# Patient Record
Sex: FEMALE | Race: WHITE | NOT HISPANIC OR LATINO | Employment: FULL TIME | URBAN - NONMETROPOLITAN AREA
[De-identification: names, ages, dates, MRNs, and addresses within clinical notes are randomized per-mention and may not be internally consistent; named-entity substitution may affect disease eponyms.]

---

## 2023-11-22 ENCOUNTER — OFFICE VISIT (OUTPATIENT)
Dept: URGENT CARE | Facility: CLINIC | Age: 28
End: 2023-11-22
Payer: COMMERCIAL

## 2023-11-22 VITALS
HEART RATE: 101 BPM | OXYGEN SATURATION: 98 % | RESPIRATION RATE: 17 BRPM | TEMPERATURE: 98.3 F | DIASTOLIC BLOOD PRESSURE: 87 MMHG | SYSTOLIC BLOOD PRESSURE: 109 MMHG
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
## 2023-11-22 PROCEDURE — 99203 OFFICE O/P NEW LOW 30 MIN: CPT | Performed by: STUDENT IN AN ORGANIZED HEALTH CARE EDUCATION/TRAINING PROGRAM

## 2023-11-22 PROCEDURE — S9088 SERVICES PROVIDED IN URGENT: HCPCS | Performed by: STUDENT IN AN ORGANIZED HEALTH CARE EDUCATION/TRAINING PROGRAM

## 2023-11-22 RX ORDER — AMOXICILLIN AND CLAVULANATE POTASSIUM 875; 125 MG/1; MG/1
1 TABLET, FILM COATED ORAL EVERY 12 HOURS SCHEDULED
Qty: 14 TABLET | Refills: 0 | Status: SHIPPED | OUTPATIENT
Start: 2023-11-22 | End: 2023-11-29

## 2023-11-22 NOTE — PATIENT INSTRUCTIONS
Animal Bite   WHAT YOU NEED TO KNOW:   Animal bite injuries range from shallow cuts to deep, life-threatening wounds. An animal can cut or puncture the skin when it bites. Your skin may be torn from your body. Your skin may swell or bruise even if the bite does not break the skin. Animal bites occur more often on the hands, arms, legs, and face. Bites from dogs and cats are the most common injuries. DISCHARGE INSTRUCTIONS:   Return to the emergency department if:   You have a fever. Your wound is red, swollen, and draining pus. You see red streaks on the skin around the wound. You can no longer move the bitten area. Your heartbeat and breathing are much faster than usual.    You feel dizzy and confused. Contact your healthcare provider if:   Your pain does not get better, even after you take pain medicine. You have nightmares or flashbacks about the animal bite. You have questions or concerns about your condition or care. Medicines: You may need any of the following:  Antibiotics  prevent or treat a bacterial infection. Prescription pain medicine  may be given. Ask how to take this medicine safely. A tetanus vaccine  may be needed to prevent tetanus. Tetanus is a life-threatening bacterial infection that affects the nerves and muscles. The bacteria can be spread through animal bites. A rabies vaccine  may be needed to prevent rabies. Rabies is a life-threatening viral infection. The virus can be spread through animal bites. Take your medicine as directed. Contact your healthcare provider if you think your medicine is not helping or if you have side effects. Tell your provider if you are allergic to any medicine. Keep a list of the medicines, vitamins, and herbs you take. Include the amounts, and when and why you take them. Bring the list or the pill bottles to follow-up visits. Carry your medicine list with you in case of an emergency.     Follow up with your healthcare provider in 1 to 2 days: You may need to return to have your stitches removed. Write down your questions so you remember to ask them during your visits. Self-care:   Apply antibiotic ointment as directed. This helps prevent infection in minor skin wounds. It is available without a doctor's order. Keep the wound clean and covered. Wash the wound every day with soap and water or germ-killing cleanser. Ask your healthcare provider about the kinds of bandages to use. Apply ice on your wound. Ice helps decrease swelling and pain. Ice may also help prevent tissue damage. Use an ice pack, or put crushed ice in a plastic bag. Cover it with a towel and place it on your wound for 15 to 20 minutes every hour or as directed. Elevate the wound area. Raise your wound above the level of your heart as often as you can. This will help decrease swelling and pain. Prop your wound on pillows or blankets to keep it elevated comfortably. Prevent another animal bite:   Learn to recognize the signs of a scared or angry pet. Avoid quick, sudden movements. Do not step between animals that are fighting. Do not leave a pet alone with a young child. Do not disturb an animal while it eats, sleeps, or cares for its young. Do not approach an animal you do not know, especially one that is tied up or caged. Stay away from animals that seem sick or act strangely. Do not feed or capture wild animals. © Copyright [facility] 2023 Information is for End User's use only and may not be sold, redistributed or otherwise used for commercial purposes. The above information is an  only. It is not intended as medical advice for individual conditions or treatments. Talk to your doctor, nurse or pharmacist before following any medical regimen to see if it is safe and effective for you.

## 2023-11-22 NOTE — LETTER
November 22, 2023     Patient: Mahnaz Hollis   YOB: 1995   Date of Visit: 11/22/2023       To Whom it May Concern:    Mahnaz Hollis was seen in my clinic on 11/22/2023. If you have any questions or concerns, please don't hesitate to call.          Sincerely,          Rossana Huff, DO        CC: No Recipients

## 2023-11-22 NOTE — PROGRESS NOTES
[facility]        NAME: Wendie Dick is a 29 y.o. female  : 1995    MRN: [id number]    Assessment and Plan   Cat bite, initial encounter [W55.01XA]  1. Cat bite, initial encounter  amoxicillin-clavulanate (AUGMENTIN) 875-125 mg per tablet        Superficial lacerations <2cm and puncture wounds noted- no repair needed. Will start on augmentin course. Discussed importance of tdap and that I recommend it strongly but pt refused despite benefits discussed. Patient Instructions       Follow up with PCP in 3-5 days. Proceed to  ER if symptoms worsen. Chief Complaint     Chief Complaint   Patient presents with    Cat Bite     Bit by her own cat this am in face, and under left upper arm   Request note for work         History of Present Illness       HPI    p/w cat bite to her chin and left upper arm. Domestic cat, up to date with vaccines, no signs of rabies infection. Reports pt and cat were sleeping and on awakening her cat got scared and ended up biting her. Reports some bleeding on chin which has stopped. She washed all the areas with antibacterial green soap and has applied mupirocin. States never getting tdap and does not want one today. Review of Systems   Review of Systems   Constitutional:  Negative for chills and fever. HENT:  Negative for ear pain and sore throat. Eyes:  Negative for pain and visual disturbance. Respiratory:  Negative for cough, chest tightness and shortness of breath. Cardiovascular:  Negative for chest pain and palpitations. Gastrointestinal:  Negative for abdominal pain, constipation, diarrhea, nausea and vomiting. Genitourinary:  Negative for dysuria, hematuria and menstrual problem. Musculoskeletal:  Negative for arthralgias and back pain. Skin:  Positive for wound. Negative for color change and rash. Neurological:  Negative for seizures and syncope. Psychiatric/Behavioral:  Negative for dysphoric mood and suicidal ideas.     All other systems reviewed and are negative. Current Medications       Current Outpatient Medications:     amoxicillin-clavulanate (AUGMENTIN) 875-125 mg per tablet, Take 1 tablet by mouth every 12 (twelve) hours for 7 days, Disp: 14 tablet, Rfl: 0    Current Allergies     Allergies as of 11/22/2023    (No Known Allergies)            The following portions of the patient's history were reviewed and updated as appropriate: allergies, current medications, past family history, past medical history, past social history, past surgical history and problem list.     History reviewed. No pertinent past medical history. Past Surgical History:   Procedure Laterality Date    WISDOM TOOTH EXTRACTION         No family history on file. Medications have been verified. Objective   /87   Pulse 101   Temp 98.3 °F (36.8 °C)   Resp 17   SpO2 98%        Physical Exam     Physical Exam  Constitutional:       General: She is not in acute distress. Appearance: Normal appearance. HENT:      Head: Normocephalic and atraumatic. Eyes:      Extraocular Movements: Extraocular movements intact. Conjunctiva/sclera: Conjunctivae normal.   Cardiovascular:      Rate and Rhythm: Normal rate. Pulmonary:      Effort: Pulmonary effort is normal. No respiratory distress. Skin:     General: Skin is warm and dry. Neurological:      General: No focal deficit present. Mental Status: She is alert and oriented to person, place, and time.    Psychiatric:         Mood and Affect: Mood normal.         Behavior: Behavior normal.

## 2024-05-29 ENCOUNTER — VBI (OUTPATIENT)
Dept: ADMINISTRATIVE | Facility: OTHER | Age: 29
End: 2024-05-29

## 2024-07-29 ENCOUNTER — TELEPHONE (OUTPATIENT)
Dept: FAMILY MEDICINE CLINIC | Facility: CLINIC | Age: 29
End: 2024-07-29

## 2024-07-29 NOTE — TELEPHONE ENCOUNTER
If patient returns call, patient may be scheduled with any Provider in [facility]. Please search by department as there are residents available. Please make patient aware if they are seen by a resident they will also be seen by Dr. Chavis. Thank you

## 2024-07-29 NOTE — TELEPHONE ENCOUNTER
Called patient to let her know we have available spaces before her apt with us. She didn't answer, I left a voicemail to call the office back if she would like to reschedule her apt sooner.    Note:  In addition to Dr. Chavis and Vaibhav Rayo, we have 2 residents schedules available with immediate appointments. Please search by [facility] or contact the office for assistance. Thank you

## 2024-09-09 ENCOUNTER — VBI (OUTPATIENT)
Dept: ADMINISTRATIVE | Facility: OTHER | Age: 29
End: 2024-09-09

## 2024-09-09 NOTE — TELEPHONE ENCOUNTER
09/09/24 8:06 AM     Chart reviewed for Pap Smear (HPV) aka Cervical Cancer Screening was/were not submitted to the patient's insurance.     Azucena Bang MA   [facility]

## 2024-09-16 ENCOUNTER — TELEPHONE (OUTPATIENT)
Dept: FAMILY MEDICINE CLINIC | Facility: CLINIC | Age: 29
End: 2024-09-16

## 2024-09-16 NOTE — TELEPHONE ENCOUNTER
Called patient to reschedule her appt. Dr. Chavis is not feeling well and needs to cancel office hours this afternoon.     Please see below for guidance in rescheduling for [facility]:    Please advise the patient they can see a resident (who is also a Doctor) Dr. Chavis will see them at that appointment as well. The reason her appointments are out that far because she is seeing patients with the residents during their appt time. She only sees her own patients on Monday 's and Friday's. Tues Wed and Thurs she sees patients with the residents     If the patient calls back please offer and appt with Dr. Zayas or Dr. Chavira at [facility]. Dr. Chavis will still see them during that visit. There are plenty of open visits.    Alternatively, Vaibhav Rayo PA-C also sees patients Monday through Friday. Is is not fluent in [place] however he worked at the [employer] the past several years and he is able to communicate in [place] and uses the interpretor to be sure everything is translated properly.    Thank you

## 2024-10-11 ENCOUNTER — VBI (OUTPATIENT)
Dept: ADMINISTRATIVE | Facility: OTHER | Age: 29
End: 2024-10-11

## 2024-10-11 NOTE — TELEPHONE ENCOUNTER
10/11/24 10:58 AM     Chart reviewed for Cervical Cancer Screening was/were not submitted to the patient's insurance.     Azucena Bang MA   [facility]

## 2024-11-26 ENCOUNTER — OFFICE VISIT (OUTPATIENT)
Dept: FAMILY MEDICINE CLINIC | Facility: CLINIC | Age: 29
End: 2024-11-26
Payer: COMMERCIAL

## 2024-11-26 ENCOUNTER — TELEPHONE (OUTPATIENT)
Age: 29
End: 2024-11-26

## 2024-11-26 VITALS
BODY MASS INDEX: 19.9 KG/M2 | HEIGHT: 61 IN | HEART RATE: 96 BPM | TEMPERATURE: 97.2 F | DIASTOLIC BLOOD PRESSURE: 52 MMHG | WEIGHT: 105.4 LBS | OXYGEN SATURATION: 99 % | SYSTOLIC BLOOD PRESSURE: 98 MMHG

## 2024-11-26 DIAGNOSIS — F90.2 ATTENTION DEFICIT HYPERACTIVITY DISORDER (ADHD), COMBINED TYPE: Primary | ICD-10-CM

## 2024-11-26 DIAGNOSIS — F90.9 HYPERACTIVITY: ICD-10-CM

## 2024-11-26 PROCEDURE — 99204 OFFICE O/P NEW MOD 45 MIN: CPT | Performed by: FAMILY MEDICINE

## 2024-11-26 RX ORDER — CHOLECALCIFEROL (VITAMIN D3) 125 MCG
1 CAPSULE ORAL DAILY
COMMUNITY

## 2024-11-26 NOTE — TELEPHONE ENCOUNTER
Contacted patient in regards to ASAP Referral  in attempts to verify patient's needs of services and add patient to proper wait list. spoke with patient whom stated is interested in talk therapy. Pt has been added to the high priority wait list for talk therapy. Closing referral.

## 2024-11-26 NOTE — PROGRESS NOTES
"Name: Brandon Chung      : 1995      MRN: [id number]  Encounter Provider: Javid Zayas MD  Encounter Date: 2024   [facility]  :  Assessment & Plan  Attention deficit hyperactivity disorder (ADHD), combined type  Meets DSM criteria for ADHD as noted in HPI  Will rule out thyroid disturbance   Pt agreeable to talk therapy at this time and would like to hold off on pharmacotherapy  LUCAS 6. PHQ 0.  Denies A/V hallucinations  No personal hx of psychiatric illness.       Hyperactivity    Orders:    T4, free; Future    TSH, 3rd generation; Future    Ambulatory referral to Psych Services; Future           History of Present Illness     Patient is a 29-year-old female with no past medical history presenting to the clinic to establish care.  Patient would like an assessment for ADD/ADHD.  Patient has been struggling with multiple symptoms of ADHD since the age of 13 years old at 2 separate locations including school and home.  Patient endorses symptoms of difficulty with focusing, disorganization, fidgetiness, difficulty starting tasks and finishing tasks, procrastination, making careless mistakes, missing the big picture, easily distractibility, \"always feels like something needs to be done.\"    As patient is adopted, does not know her family history but denies any personal history of psychiatric illnesses.  Patient denies any audio or visual hallucinations.  Patient also states that she does not feel anxious all the time or depressed.  LUCAS score 6.  PHQ score 0.    At this time, patient does meet clinic DSM 5 criteria for ADHD.  Patient states that she does not currently want medications and would like to try all other options first.  Patient is amenable to talk therapy at this time and agreeable to ruling out organic causes such as thyroid disturbance. Denies diarrhea, hair loss, palpitations, weight changes.    Patient had a Pap smear done 2 years ago and " "signed record of release.  LMP: November 4  Every 30 days. Bleeds for 4 days, then spotting for another 1 day.   In relationship with same sex partner. No hx of STD.     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       Review of Systems   Constitutional:  Negative for fever and unexpected weight change.   Respiratory:  Negative for shortness of breath.    Cardiovascular:  Negative for chest pain and palpitations.   Gastrointestinal:  Negative for nausea and vomiting.     Past Medical History   History reviewed. No pertinent past medical history.  Past Surgical History:   Procedure Laterality Date    WISDOM TOOTH EXTRACTION       Family History   Adopted: Yes      reports that she has never smoked. She has never been exposed to tobacco smoke. She has never used smokeless tobacco. She reports that she does not currently use alcohol. She reports current drug use. Drug: Marijuana.  Current Outpatient Medications on File Prior to Visit   Medication Sig Dispense Refill    L-Theanine 200 MG CAPS Take 1 mg by mouth daily Helps focus and keep thoughts organized       No current facility-administered medications on file prior to visit.   No Known Allergies        Objective   BP 98/52 (BP Location: Left arm, Patient Position: Sitting, Cuff Size: Adult)   Pulse 96   Temp (!) 97.2 °F (36.2 °C) (Tympanic)   Ht 5' 1\" (1.549 m)   Wt 47.8 kg (105 lb 6.4 oz)   SpO2 99%   BMI 19.92 kg/m²      Physical Exam  Vitals reviewed.   Constitutional:       General: She is not in acute distress.     Appearance: She is well-developed.   HENT:      Head: Normocephalic.      Nose: No rhinorrhea.   Eyes:      General: No scleral icterus.     Extraocular Movements: Extraocular movements intact.      Conjunctiva/sclera: Conjunctivae normal.   Cardiovascular:      Rate and Rhythm: Normal rate and regular rhythm.      Heart sounds: No murmur heard.  Pulmonary:      Effort: Pulmonary effort is normal. No respiratory distress.      " Breath sounds: Normal breath sounds. No wheezing.   Abdominal:      General: Bowel sounds are normal.      Palpations: Abdomen is soft.      Tenderness: There is no abdominal tenderness.   Musculoskeletal:      Right lower leg: No edema.   Skin:     General: Skin is warm.      Capillary Refill: Capillary refill takes less than 2 seconds.   Neurological:      Mental Status: She is alert.      Gait: Gait normal.   Psychiatric:         Mood and Affect: Mood normal.         Behavior: Behavior normal.

## 2024-12-14 ENCOUNTER — VBI (OUTPATIENT)
Dept: ADMINISTRATIVE | Facility: OTHER | Age: 29
End: 2024-12-14

## 2024-12-14 NOTE — TELEPHONE ENCOUNTER
12/14/24 7:51 AM     Chart reviewed for Cervical Cancer Screening was/were not submitted to the patient's insurance.     Azucena Bang MA   [facility]

## 2024-12-31 ENCOUNTER — TELEPHONE (OUTPATIENT)
Age: 29
End: 2024-12-31

## 2025-01-02 NOTE — TELEPHONE ENCOUNTER
Called patient to inform her that we have not received labs from [facility]. I gave her our fax number to contact them to send it to our office. She is aware.

## 2025-01-07 ENCOUNTER — OFFICE VISIT (OUTPATIENT)
Dept: FAMILY MEDICINE CLINIC | Facility: CLINIC | Age: 30
End: 2025-01-07
Payer: COMMERCIAL

## 2025-01-07 VITALS
TEMPERATURE: 96.9 F | SYSTOLIC BLOOD PRESSURE: 102 MMHG | HEIGHT: 61 IN | OXYGEN SATURATION: 98 % | BODY MASS INDEX: 20.43 KG/M2 | DIASTOLIC BLOOD PRESSURE: 64 MMHG | WEIGHT: 108.2 LBS | HEART RATE: 72 BPM

## 2025-01-07 DIAGNOSIS — Z77.120: ICD-10-CM

## 2025-01-07 DIAGNOSIS — Z00.00 ANNUAL PHYSICAL EXAM: Primary | ICD-10-CM

## 2025-01-07 DIAGNOSIS — Z59.19: ICD-10-CM

## 2025-01-07 PROCEDURE — 99395 PREV VISIT EST AGE 18-39: CPT | Performed by: FAMILY MEDICINE

## 2025-01-07 PROCEDURE — 99214 OFFICE O/P EST MOD 30 MIN: CPT | Performed by: FAMILY MEDICINE

## 2025-01-07 SDOH — ECONOMIC STABILITY - HOUSING INSECURITY: OTHER INADEQUATE HOUSING: Z59.19

## 2025-01-07 NOTE — PATIENT INSTRUCTIONS
"Patient Education     Routine physical for adults   The Basics   Written by the doctors and editors at [facility]   What is a physical? -- A physical is a routine visit, or \"check-up,\" with your doctor. You might also hear it called a \"wellness visit\" or \"preventive visit.\"  During each visit, the doctor will:   Ask about your physical and mental health   Ask about your habits, behaviors, and lifestyle   Do an exam   Give you vaccines if needed   Talk to you about any medicines you take   Give advice about your health   Answer your questions  Getting regular check-ups is an important part of taking care of your health. It can help your doctor find and treat any problems you have. But it's also important for preventing health problems.  A routine physical is different from a \"sick visit.\" A sick visit is when you see a doctor because of a health concern or problem. Since physicals are scheduled ahead of time, you can think about what you want to ask the doctor.  How often should I get a physical? -- It depends on your age and health. In general, for people age 21 years and older:   If you are younger than 50 years, you might be able to get a physical every 3 years.   If you are 50 years or older, your doctor might recommend a physical every year.  If you have an ongoing health condition, like diabetes or high blood pressure, your doctor will probably want to see you more often.  What happens during a physical? -- In general, each visit will include:   Physical exam - The doctor or nurse will check your height, weight, heart rate, and blood pressure. They will also look at your eyes and ears. They will ask about how you are feeling and whether you have any symptoms that bother you.   Medicines - It's a good idea to bring a list of all the medicines you take to each doctor visit. Your doctor will talk to you about your medicines and answer any questions. Tell them if you are having any side effects that bother you. You " "should also tell them if you are having trouble paying for any of your medicines.   Habits and behaviors - This includes:   Your diet   Your exercise habits   Whether you smoke, drink alcohol, or use drugs   Whether you are sexually active   Whether you feel safe at home  Your doctor will talk to you about things you can do to improve your health and lower your risk of health problems. They will also offer help and support. For example, if you want to quit smoking, they can give you advice and might prescribe medicines. If you want to improve your diet or get more physical activity, they can help you with this, too.   Lab tests, if needed - The tests you get will depend on your age and situation. For example, your doctor might want to check your:   Cholesterol   Blood sugar   Iron level   Vaccines - The recommended vaccines will depend on your age, health, and what vaccines you already had. Vaccines are very important because they can prevent certain serious or deadly infections.   Discussion of screening - \"Screening\" means checking for diseases or other health problems before they cause symptoms. Your doctor can recommend screening based on your age, risk, and preferences. This might include tests to check for:   Cancer, such as breast, prostate, cervical, ovarian, colorectal, prostate, lung, or skin cancer   Sexually transmitted infections, such as chlamydia and gonorrhea   Mental health conditions like depression and anxiety  Your doctor will talk to you about the different types of screening tests. They can help you decide which screenings to have. They can also explain what the results might mean.   Answering questions - The physical is a good time to ask the doctor or nurse questions about your health. If needed, they can refer you to other doctors or specialists, too.  Adults older than 65 years often need other care, too. As you get older, your doctor will talk to you about:   How to prevent falling at " home   Hearing or vision tests   Memory testing   How to take your medicines safely   Making sure that you have the help and support you need at home  All topics are updated as new evidence becomes available and our peer review process is complete.  This topic retrieved from [employer] on: May 02, 2024.  Topic 470458 Version 1.0  Release: 32.4.3 - C32.122  © 2024 UpToDate, Inc. and/or its affiliates. All rights reserved.  Consumer Information Use and Disclaimer   Disclaimer: This generalized information is a limited summary of diagnosis, treatment, and/or medication information. It is not meant to be comprehensive and should be used as a tool to help the user understand and/or assess potential diagnostic and treatment options. It does NOT include all information about conditions, treatments, medications, side effects, or risks that may apply to a specific patient. It is not intended to be medical advice or a substitute for the medical advice, diagnosis, or treatment of a health care provider based on the health care provider's examination and assessment of a patient's specific and unique circumstances. Patients must speak with a health care provider for complete information about their health, medical questions, and treatment options, including any risks or benefits regarding use of medications. This information does not endorse any treatments or medications as safe, effective, or approved for treating a specific patient. UpToDate, Inc. and its affiliates disclaim any warranty or liability relating to this information or the use thereof.The use of this information is governed by the Terms of Use, available at https://www.wolters"[employer]uwer.com/en/know/clinical-effectiveness-terms. 2024© UpToDate, Inc. and its affiliates and/or licensors. All rights reserved.  Copyright   © 2024 UpToDate, Inc. and/or its affiliates. All rights reserved.

## 2025-01-07 NOTE — PROGRESS NOTES
Adult Annual Physical  Name: Brandon Chung      : 1995      MRN: [id number]  Encounter Provider: Javid Zayas MD  Encounter Date: 2025   Encounter department: [facility]ending [facility] to [facility]    Assessment & Plan  Annual physical exam  Follows with GYN for pap smears       Mold on surfaces in home  Has some mold in home  Pt wants to get tested for molds and other allergic causes  Not currently symptomatic  Orders:    Allergen, MOLD Panel; Future    [facility]ergy Panel, Adult; Future    Immunizations and preventive care screenings were discussed with patient today. Appropriate education was printed on patient's after visit summary.    Counseling:  Alcohol/drug use: discussed moderation in alcohol intake, the recommendations for healthy alcohol use, and avoidance of illicit drug use.  Dental Health: discussed importance of regular tooth brushing, flossing, and dental visits.  Injury prevention: discussed safety/seat belts, safety helmets, smoke detectors, carbon monoxide detectors, and smoking near bedding or upholstery.  Sexual health: discussed sexually transmitted diseases, partner selection, use of condoms, avoidance of unintended pregnancy, and contraceptive alternatives.  Exercise: the importance of regular exercise/physical activity was discussed. Recommend exercise 3-5 times per week for at least 30 minutes.       Depression Screening and Follow-up Plan: Patient was screened for depression during today's encounter. They screened negative with a PHQ-2 score of 1.    Tobacco Cessation Counseling: Tobacco cessation counseling was provided. The patient is sincerely urged to quit consumption of tobacco. She is not ready to quit tobacco.         History of Present Illness     Adult Annual Physical:  Patient presents for annual physical. Follows with Gyn and has upcoming appt on . .     Diet and Physical Activity:  - Diet/Nutrition: well balanced diet and consuming 3-5 servings of  "fruits/vegetables daily. Is vegan so avoids eggs, fish.  - Exercise: no formal exercise.    Depression Screening:  - PHQ-2 Score: 1    General Health:  - Sleep: sleeps well and 7-8 hours of sleep on average.  - Hearing: normal hearing bilateral ears.  - Vision: no vision problems, wears glasses and most recent eye exam > 1 year ago. 2 years ago last visit  - Dental: regular dental visits and brushes teeth once daily. Has upcoming appt    /GYN Health:  - Follows with GYN: yes.   - Last menstrual cycle: 12/28/2024.   - History of STDs: yes  - Contraception:. Had herpes in 2015 and adequately treated. Regular 30 days. Bleeds for 2 days and light spotting before and after.      Review of Systems   Constitutional:  Negative for chills and fever.   HENT:  Negative for ear pain and sore throat.    Eyes:  Negative for pain and visual disturbance.   Respiratory:  Negative for cough and shortness of breath.    Cardiovascular:  Negative for chest pain and palpitations.   Gastrointestinal:  Negative for abdominal pain and vomiting.   Genitourinary:  Negative for dysuria and hematuria.   Musculoskeletal:  Negative for arthralgias and back pain.   Skin:  Negative for color change and rash.   Neurological:  Negative for seizures and syncope.         Objective   /64   Pulse 72   Temp (!) 96.9 °F (36.1 °C)   Ht 5' 0.6\" (1.539 m)   Wt 49.1 kg (108 lb 3.2 oz)   SpO2 98%   BMI 20.71 kg/m²     Physical Exam  Vitals reviewed.   Constitutional:       General: She is not in acute distress.     Appearance: She is well-developed.   HENT:      Head: Normocephalic.   Eyes:      General: No scleral icterus.     Extraocular Movements: Extraocular movements intact.      Conjunctiva/sclera: Conjunctivae normal.   Cardiovascular:      Rate and Rhythm: Normal rate and regular rhythm.      Pulses: Normal pulses.      Heart sounds: Normal heart sounds. No murmur heard.  Pulmonary:      Effort: Pulmonary effort is normal. No respiratory " distress.      Breath sounds: Normal breath sounds.   Abdominal:      General: Bowel sounds are normal.      Palpations: Abdomen is soft.      Tenderness: There is no abdominal tenderness.   Musculoskeletal:         General: No swelling.      Right lower leg: No edema.      Left lower leg: No edema.   Skin:     General: Skin is warm.      Capillary Refill: Capillary refill takes less than 2 seconds.   Neurological:      Mental Status: She is alert.   Psychiatric:         Mood and Affect: Mood normal.

## 2025-02-12 ENCOUNTER — APPOINTMENT (OUTPATIENT)
Dept: LAB | Facility: CLINIC | Age: 30
End: 2025-02-12
Payer: COMMERCIAL

## 2025-02-12 DIAGNOSIS — Z77.120: ICD-10-CM

## 2025-02-12 DIAGNOSIS — Z59.19: ICD-10-CM

## 2025-02-12 PROCEDURE — 86003 ALLG SPEC IGE CRUDE XTRC EA: CPT

## 2025-02-12 PROCEDURE — 36415 COLL VENOUS BLD VENIPUNCTURE: CPT

## 2025-02-12 PROCEDURE — 82785 ASSAY OF IGE: CPT

## 2025-02-12 SDOH — ECONOMIC STABILITY - HOUSING INSECURITY: OTHER INADEQUATE HOUSING: Z59.19

## 2025-02-13 LAB
A ALTERNATA IGE QN: <0.1 KUA/I (ref 0–0.1)
A FUMIGATUS IGE QN: <0.1 KUA/I (ref 0–0.1)
BERMUDA GRASS IGE QN: <0.1 KUA/I (ref 0–0.1)
BOXELDER IGE QN: <0.1 KUA/I (ref 0–0.1)
C HERBARUM IGE QN: <0.1 KUA/I (ref 0–0.1)
CAT DANDER IGE QN: <0.1 KUA/I (ref 0–0.1)
CMN PIGWEED IGE QN: <0.1 KUA/I (ref 0–0.1)
COMMON RAGWEED IGE QN: <0.1 KUA/I (ref 0–0.1)
COTTONWOOD IGE QN: <0.1 KUA/I (ref 0–0.1)
D FARINAE IGE QN: <0.1 KUA/I (ref 0–0.1)
D PTERONYSS IGE QN: <0.1 KUA/I (ref 0–0.1)
DOG DANDER IGE QN: <0.1 KUA/I (ref 0–0.1)
LONDON PLANE IGE QN: <0.1 KUA/I (ref 0–0.1)
MOUSE URINE PROT IGE QN: <0.1 KUA/I (ref 0–0.1)
MT JUNIPER IGE QN: <0.1 KUA/I (ref 0–0.1)
MUGWORT IGE QN: <0.1 KUA/I (ref 0–0.1)
P NOTATUM IGE QN: <0.1 KUA/I (ref 0–0.1)
ROACH IGE QN: 0.12 KUA/I (ref 0–0.1)
SHEEP SORREL IGE QN: <0.1 KUA/I (ref 0–0.1)
SILVER BIRCH IGE QN: <0.1 KUA/I (ref 0–0.1)
TIMOTHY IGE QN: <0.1 KUA/I (ref 0–0.1)
TOTAL IGE SMQN RAST: 35.3 KU/L (ref 0–113)
WALNUT IGE QN: <0.1 KUA/I (ref 0–0.1)
WHITE ASH IGE QN: <0.1 KUA/I (ref 0–0.1)
WHITE ELM IGE QN: <0.1 KUA/I (ref 0–0.1)
WHITE MULBERRY IGE QN: <0.1 KUA/I (ref 0–0.1)
WHITE OAK IGE QN: <0.1 KUA/I (ref 0–0.1)

## 2025-02-14 ENCOUNTER — TELEPHONE (OUTPATIENT)
Dept: FAMILY MEDICINE CLINIC | Facility: CLINIC | Age: 30
End: 2025-02-14

## 2025-02-14 ENCOUNTER — RESULTS FOLLOW-UP (OUTPATIENT)
Dept: OTHER | Facility: HOSPITAL | Age: 30
End: 2025-02-14

## 2025-02-14 LAB
A ALTERNATA IGE QN: <0.1 KU/L
A FUMIGATUS IGE QN: <0.1 KU/L
A PULLULANS IGE QN: <0.1 KU/L
C ALBICANS IGE QN: <0.1 KU/L
C HERBARUM IGE QN: <0.1 KU/L
E PURPURASCENS IGE QN: <0.1 KU/L
FUSARIUM PROLIFERATUM: <0.1 KU/L
Lab: NORMAL
M RACEMOSUS IGE QN: <0.1 KU/L
PENICILLIUM CHRYSOGENUM: <0.1 KU/L
PHOMA BETAE: <0.1 KU/L
SETOMELANOMMA ROSTRATA: <0.1 KU/L
STEMPHYLIUM HERBARUM: <0.1 KU/L

## 2025-02-14 NOTE — TELEPHONE ENCOUNTER
Patient was called and Provider's note was relayed to Patient.  Patient had no further questions at this time.

## 2025-02-27 ENCOUNTER — TELEPHONE (OUTPATIENT)
Age: 30
End: 2025-02-27

## 2025-02-27 NOTE — TELEPHONE ENCOUNTER
Patient on wait list for talk therapy services. Writer reached out to verify if Pt is still interested in service, and if would like to remain on [facility].  No answer, vm full.